# Patient Record
Sex: FEMALE | Race: WHITE | NOT HISPANIC OR LATINO | Employment: FULL TIME | RURAL
[De-identification: names, ages, dates, MRNs, and addresses within clinical notes are randomized per-mention and may not be internally consistent; named-entity substitution may affect disease eponyms.]

---

## 2020-12-15 ENCOUNTER — HISTORICAL (OUTPATIENT)
Dept: ADMINISTRATIVE | Facility: HOSPITAL | Age: 35
End: 2020-12-15

## 2020-12-15 LAB
BACTERIA #/AREA URNS HPF: ABNORMAL /HPF
BILIRUB UR QL STRIP: NEGATIVE MG/DL
CLARITY UR: CLEAR
COLOR UR: YELLOW
GLUCOSE UR STRIP-MCNC: >=1000 MG/DL
KETONES UR STRIP-SCNC: NEGATIVE MG/DL
LEUKOCYTE ESTERASE UR QL STRIP: NEGATIVE LEU/UL
NITRITE UR QL STRIP: NEGATIVE
PH UR STRIP: 6 PH UNITS (ref 5–8)
PROT UR QL STRIP: NEGATIVE MG/DL
RBC # UR STRIP: NEGATIVE ERY/UL
RBC #/AREA URNS HPF: ABNORMAL /HPF (ref 0–3)
SP GR UR STRIP: 1.02 (ref 1–1.03)
SQUAMOUS #/AREA URNS LPF: ABNORMAL /LPF
UROBILINOGEN UR STRIP-ACNC: 0.2 EU/DL
WBC #/AREA URNS HPF: ABNORMAL /HPF (ref 0–5)

## 2022-01-12 ENCOUNTER — OFFICE VISIT (OUTPATIENT)
Dept: PRIMARY CARE CLINIC | Facility: CLINIC | Age: 37
End: 2022-01-12
Payer: COMMERCIAL

## 2022-01-12 VITALS
SYSTOLIC BLOOD PRESSURE: 91 MMHG | TEMPERATURE: 98 F | HEART RATE: 74 BPM | RESPIRATION RATE: 18 BRPM | DIASTOLIC BLOOD PRESSURE: 65 MMHG | OXYGEN SATURATION: 100 %

## 2022-01-12 DIAGNOSIS — M79.10 MUSCLE PAIN: ICD-10-CM

## 2022-01-12 DIAGNOSIS — R06.02 SHORTNESS OF BREATH: ICD-10-CM

## 2022-01-12 DIAGNOSIS — D51.9 ANEMIA DUE TO VITAMIN B12 DEFICIENCY, UNSPECIFIED B12 DEFICIENCY TYPE: ICD-10-CM

## 2022-01-12 DIAGNOSIS — I10 WELL-CONTROLLED HYPERTENSION: ICD-10-CM

## 2022-01-12 DIAGNOSIS — R68.83 CHILLS: ICD-10-CM

## 2022-01-12 DIAGNOSIS — R51.9 NONINTRACTABLE HEADACHE, UNSPECIFIED CHRONICITY PATTERN, UNSPECIFIED HEADACHE TYPE: ICD-10-CM

## 2022-01-12 DIAGNOSIS — Z20.822 EXPOSURE TO COVID-19 VIRUS: Primary | ICD-10-CM

## 2022-01-12 LAB
CTP QC/QA: YES
FLUAV AG NPH QL: NEGATIVE
FLUBV AG NPH QL: NEGATIVE
SARS-COV-2 AG RESP QL IA.RAPID: NEGATIVE

## 2022-01-12 PROCEDURE — 99204 OFFICE O/P NEW MOD 45 MIN: CPT | Mod: ,,, | Performed by: NURSE PRACTITIONER

## 2022-01-12 PROCEDURE — 87635 SARS-COV-2 COVID-19 AMP PRB: CPT | Mod: ,,, | Performed by: CLINICAL MEDICAL LABORATORY

## 2022-01-12 PROCEDURE — 87635 SARS-COV-2 (COVID-19) QUALITATIVE PCR: ICD-10-PCS | Mod: ,,, | Performed by: CLINICAL MEDICAL LABORATORY

## 2022-01-12 PROCEDURE — 87428 POCT SARS-COV2 (COVID) WITH FLU ANTIGEN: ICD-10-PCS | Mod: QW,,, | Performed by: NURSE PRACTITIONER

## 2022-01-12 PROCEDURE — 87428 SARSCOV & INF VIR A&B AG IA: CPT | Mod: QW,,, | Performed by: NURSE PRACTITIONER

## 2022-01-12 PROCEDURE — 99204 PR OFFICE/OUTPT VISIT, NEW, LEVL IV, 45-59 MIN: ICD-10-PCS | Mod: ,,, | Performed by: NURSE PRACTITIONER

## 2022-01-12 RX ORDER — ERGOCALCIFEROL 1.25 MG/1
50000 CAPSULE ORAL
COMMUNITY
Start: 2021-11-16 | End: 2022-11-29

## 2022-01-12 RX ORDER — ATENOLOL AND CHLORTHALIDONE TABLET 50; 25 MG/1; MG/1
1 TABLET ORAL DAILY
COMMUNITY
Start: 2021-12-09 | End: 2023-11-09

## 2022-01-12 RX ORDER — PHENTERMINE HYDROCHLORIDE 37.5 MG/1
37.5 TABLET ORAL DAILY
COMMUNITY
Start: 2021-12-21 | End: 2022-11-29

## 2022-01-12 RX ORDER — SEMAGLUTIDE 1 MG/.5ML
1 INJECTION, SOLUTION SUBCUTANEOUS WEEKLY
COMMUNITY
Start: 2021-12-21 | End: 2022-11-29

## 2022-01-12 RX ORDER — PREDNISONE 20 MG/1
TABLET ORAL
Qty: 12 TABLET | Refills: 0 | Status: SHIPPED | OUTPATIENT
Start: 2022-01-12 | End: 2022-11-29

## 2022-01-12 RX ORDER — AZITHROMYCIN 250 MG/1
TABLET, FILM COATED ORAL
Qty: 6 TABLET | Refills: 0 | Status: SHIPPED | OUTPATIENT
Start: 2022-01-12 | End: 2022-01-17

## 2022-01-12 RX ORDER — CYANOCOBALAMIN 1000 UG/ML
1 INJECTION, SOLUTION INTRAMUSCULAR; SUBCUTANEOUS
COMMUNITY
Start: 2021-11-03 | End: 2022-11-29

## 2022-01-12 NOTE — PROGRESS NOTES
NEW CLINIC NOTE    Kathleen Gann is a 37 y.o. White female     Chief Complaint   Patient presents with    Headache    Sore Throat    Nausea    Fatigue    Cough    Generalized Body Aches    Nasal Congestion     Pt states symptom started 1/11/22. Has not taken anything otc. Has been exposed to covid.         SUBJECTIVE  Pt presents to clinic with an acute onset of chills, aches, headache, sore throat, and congestion. Reports cough is worsening as day goes on. All symptoms started last night after work. Reports she has been exposed to covid repeatedly at work. Denies taking meds for relief.      Current Outpatient Medications on File Prior to Visit   Medication Sig Dispense Refill    atenoloL-chlorthalidone (TENORETIC) 50-25 mg Tab Take 1 tablet by mouth once daily.      cyanocobalamin 1,000 mcg/mL injection Inject 1 mL into the muscle every 30 days.      phentermine (ADIPEX-P) 37.5 mg tablet Take 37.5 mg by mouth once daily.      VITAMIN D2 1,250 mcg (50,000 unit) capsule Take 50,000 Units by mouth every 7 days.      WEGOVY 1 mg/0.5 mL PnIj Inject 1 mg into the skin once a week.       No current facility-administered medications on file prior to visit.      Review of patient's allergies indicates:   Allergen Reactions    Penicillins         Past Medical History:   Diagnosis Date    Hypertension       Past Surgical History:   Procedure Laterality Date    HYSTERECTOMY      KNEE SURGERY Right      No family history on file.  Social History     Socioeconomic History    Marital status: Single   Tobacco Use    Smoking status: Current Every Day Smoker    Smokeless tobacco: Never Used   Substance and Sexual Activity    Alcohol use: Yes    Drug use: Never    Sexual activity: Yes        No results found for: WBC, HGB, HCT, MCV, PLT     CMP  No results found for: NA, K, CL, CO2, GLU, BUN, CREATININE, CALCIUM, PROT, ALBUMIN, BILITOT, ALKPHOS, AST, ALT, ANIONGAP, ESTGFRAFRICA, EGFRNONAA  No results found  for: LABA1C, HGBA1C      OBJECTIVE  BP 91/65 (BP Location: Left arm, Patient Position: Sitting, BP Method: Medium (Automatic))   Pulse 74   Temp 98.1 °F (36.7 °C) (Oral)   Resp 18   SpO2 100%      Physical Exam  Vitals and nursing note reviewed.   Constitutional:       Appearance: Normal appearance. She is obese.   HENT:      Right Ear: Tympanic membrane normal.      Left Ear: Tympanic membrane normal.      Nose: Congestion and rhinorrhea present.      Mouth/Throat:      Mouth: Mucous membranes are moist.      Pharynx: No oropharyngeal exudate or posterior oropharyngeal erythema.   Cardiovascular:      Rate and Rhythm: Normal rate and regular rhythm.      Pulses: Normal pulses.      Heart sounds: Normal heart sounds.   Pulmonary:      Effort: Pulmonary effort is normal.      Breath sounds: Normal breath sounds.   Musculoskeletal:         General: Normal range of motion.      Cervical back: Normal range of motion and neck supple.   Lymphadenopathy:      Cervical: No cervical adenopathy.   Skin:     General: Skin is warm.      Capillary Refill: Capillary refill takes less than 2 seconds.   Neurological:      Mental Status: She is alert and oriented to person, place, and time.   Psychiatric:         Mood and Affect: Mood normal.            ASSESSMENT & PLAN  1. Exposure to COVID-19 virus    2. Nonintractable headache, unspecified chronicity pattern, unspecified headache type    3. Shortness of breath    4. Chills    5. Muscle pain    6. Well-controlled hypertension    7. Anemia due to vitamin B12 deficiency, unspecified B12 deficiency type         Problem List Items Addressed This Visit    None     Visit Diagnoses     Exposure to COVID-19 virus    -  Primary    Relevant Medications    azithromycin (Z-ELLIOT) 250 MG tablet    predniSONE (DELTASONE) 20 MG tablet    Nonintractable headache, unspecified chronicity pattern, unspecified headache type        Relevant Orders    POCT SARS-COV2 (COVID) with Flu Antigen  (Completed)    Shortness of breath        Relevant Orders    COVID-19 Routine Screening    Chills        Relevant Orders    COVID-19 Routine Screening    Muscle pain        Relevant Orders    COVID-19 Routine Screening    Well-controlled hypertension        Anemia due to vitamin B12 deficiency, unspecified B12 deficiency type              Follow up in about 2 weeks (around 1/26/2022).   Rapid flu and covid neg  Symptoms consistent with covid and it has been <24 hours since onset of symptoms  Will treat as covid and order pcr covid test  Instructed quarantine at least until pcr results.

## 2022-01-12 NOTE — LETTER
January 12, 2022      [facility]  [address] [facility] [address]  Phone: [phone]  Fax: [phone]       Patient: Kathleen Gann   YOB: 1985  Date of Visit: 01/12/2022    To Whom It May Concern:    IZZY Gann  was at [facility] on 01/12/2022. The patient is not release. Pending further testing. If you have any questions or concerns, or if I can be of further assistance, please do not hesitate to contact me.    Sincerely,    Kathleen Newton NP

## 2022-01-13 ENCOUNTER — TELEPHONE (OUTPATIENT)
Dept: PRIMARY CARE CLINIC | Facility: CLINIC | Age: 37
End: 2022-01-13
Payer: COMMERCIAL

## 2022-01-13 DIAGNOSIS — R05.9 COUGH: ICD-10-CM

## 2022-01-13 DIAGNOSIS — Z20.822 EXPOSURE TO COVID-19 VIRUS: Primary | ICD-10-CM

## 2022-01-13 LAB — SARS-COV-2 RNA RESP QL NAA+PROBE: NEGATIVE

## 2022-01-13 RX ORDER — PROMETHAZINE HYDROCHLORIDE AND DEXTROMETHORPHAN HYDROBROMIDE 6.25; 15 MG/5ML; MG/5ML
5 SYRUP ORAL EVERY 4 HOURS PRN
Qty: 125 ML | Refills: 0 | Status: SHIPPED | OUTPATIENT
Start: 2022-01-13 | End: 2022-01-23

## 2022-01-13 NOTE — TELEPHONE ENCOUNTER
----- Message from Kathleen Newton NP sent at 1/13/2022  1:42 PM CST -----  Please notify pt that results are negative.      Pt notified, verbalized understanding.

## 2022-11-29 ENCOUNTER — OFFICE VISIT (OUTPATIENT)
Dept: PRIMARY CARE CLINIC | Facility: CLINIC | Age: 37
End: 2022-11-29
Payer: COMMERCIAL

## 2022-11-29 VITALS
TEMPERATURE: 98 F | OXYGEN SATURATION: 98 % | RESPIRATION RATE: 18 BRPM | WEIGHT: 224 LBS | SYSTOLIC BLOOD PRESSURE: 131 MMHG | HEIGHT: 64 IN | HEART RATE: 95 BPM | BODY MASS INDEX: 38.24 KG/M2 | DIASTOLIC BLOOD PRESSURE: 92 MMHG

## 2022-11-29 DIAGNOSIS — Z76.0 MEDICATION REFILL: ICD-10-CM

## 2022-11-29 DIAGNOSIS — N30.01 ACUTE CYSTITIS WITH HEMATURIA: Primary | ICD-10-CM

## 2022-11-29 LAB
BILIRUB SERPL-MCNC: ABNORMAL MG/DL
BLOOD URINE, POC: ABNORMAL
COLOR, POC UA: YELLOW
GLUCOSE UR QL STRIP: ABNORMAL
KETONES UR QL STRIP: ABNORMAL
LEUKOCYTE ESTERASE URINE, POC: ABNORMAL
NITRITE, POC UA: ABNORMAL
PH, POC UA: 5.5
PROTEIN, POC: ABNORMAL
SPECIFIC GRAVITY, POC UA: >1.03
UROBILINOGEN, POC UA: 0.2

## 2022-11-29 PROCEDURE — 99213 PR OFFICE/OUTPT VISIT, EST, LEVL III, 20-29 MIN: ICD-10-PCS | Mod: ,,, | Performed by: STUDENT IN AN ORGANIZED HEALTH CARE EDUCATION/TRAINING PROGRAM

## 2022-11-29 PROCEDURE — 3008F PR BODY MASS INDEX (BMI) DOCUMENTED: ICD-10-PCS | Mod: CPTII,,, | Performed by: STUDENT IN AN ORGANIZED HEALTH CARE EDUCATION/TRAINING PROGRAM

## 2022-11-29 PROCEDURE — 81003 POCT URINALYSIS W/O SCOPE: ICD-10-PCS | Mod: QW,,, | Performed by: STUDENT IN AN ORGANIZED HEALTH CARE EDUCATION/TRAINING PROGRAM

## 2022-11-29 PROCEDURE — 3075F SYST BP GE 130 - 139MM HG: CPT | Mod: CPTII,,, | Performed by: STUDENT IN AN ORGANIZED HEALTH CARE EDUCATION/TRAINING PROGRAM

## 2022-11-29 PROCEDURE — 1159F PR MEDICATION LIST DOCUMENTED IN MEDICAL RECORD: ICD-10-PCS | Mod: CPTII,,, | Performed by: STUDENT IN AN ORGANIZED HEALTH CARE EDUCATION/TRAINING PROGRAM

## 2022-11-29 PROCEDURE — 81003 URINALYSIS AUTO W/O SCOPE: CPT | Mod: QW,,, | Performed by: STUDENT IN AN ORGANIZED HEALTH CARE EDUCATION/TRAINING PROGRAM

## 2022-11-29 PROCEDURE — 1159F MED LIST DOCD IN RCRD: CPT | Mod: CPTII,,, | Performed by: STUDENT IN AN ORGANIZED HEALTH CARE EDUCATION/TRAINING PROGRAM

## 2022-11-29 PROCEDURE — 3008F BODY MASS INDEX DOCD: CPT | Mod: CPTII,,, | Performed by: STUDENT IN AN ORGANIZED HEALTH CARE EDUCATION/TRAINING PROGRAM

## 2022-11-29 PROCEDURE — 99213 OFFICE O/P EST LOW 20 MIN: CPT | Mod: ,,, | Performed by: STUDENT IN AN ORGANIZED HEALTH CARE EDUCATION/TRAINING PROGRAM

## 2022-11-29 PROCEDURE — 3075F PR MOST RECENT SYSTOLIC BLOOD PRESS GE 130-139MM HG: ICD-10-PCS | Mod: CPTII,,, | Performed by: STUDENT IN AN ORGANIZED HEALTH CARE EDUCATION/TRAINING PROGRAM

## 2022-11-29 PROCEDURE — 3080F PR MOST RECENT DIASTOLIC BLOOD PRESSURE >= 90 MM HG: ICD-10-PCS | Mod: CPTII,,, | Performed by: STUDENT IN AN ORGANIZED HEALTH CARE EDUCATION/TRAINING PROGRAM

## 2022-11-29 PROCEDURE — 3080F DIAST BP >= 90 MM HG: CPT | Mod: CPTII,,, | Performed by: STUDENT IN AN ORGANIZED HEALTH CARE EDUCATION/TRAINING PROGRAM

## 2022-11-29 RX ORDER — TIRZEPATIDE 5 MG/.5ML
INJECTION, SOLUTION SUBCUTANEOUS
COMMUNITY
End: 2023-12-08

## 2022-11-29 RX ORDER — ATORVASTATIN CALCIUM 10 MG/1
10 TABLET, FILM COATED ORAL
COMMUNITY
Start: 2022-10-14 | End: 2022-11-29 | Stop reason: SDUPTHER

## 2022-11-29 RX ORDER — ESCITALOPRAM OXALATE 10 MG/1
10 TABLET ORAL
COMMUNITY
Start: 2022-10-14 | End: 2023-12-08

## 2022-11-29 RX ORDER — LIDOCAINE AND PRILOCAINE 25; 25 MG/G; MG/G
CREAM TOPICAL
COMMUNITY
Start: 2022-11-22

## 2022-11-29 RX ORDER — SULFAMETHOXAZOLE AND TRIMETHOPRIM 800; 160 MG/1; MG/1
1 TABLET ORAL 2 TIMES DAILY
Qty: 6 TABLET | Refills: 0 | Status: SHIPPED | OUTPATIENT
Start: 2022-11-29 | End: 2022-12-02

## 2022-11-29 RX ORDER — HYDROCODONE BITARTRATE AND ACETAMINOPHEN 5; 325 MG/1; MG/1
1 TABLET ORAL EVERY 6 HOURS PRN
COMMUNITY
Start: 2022-11-08 | End: 2023-12-08

## 2022-11-29 RX ORDER — PROCHLORPERAZINE MALEATE 10 MG
10 TABLET ORAL 4 TIMES DAILY PRN
COMMUNITY
Start: 2022-11-22 | End: 2023-12-08

## 2022-11-29 RX ORDER — ONDANSETRON HYDROCHLORIDE 8 MG/1
8 TABLET, FILM COATED ORAL
COMMUNITY
Start: 2022-11-22 | End: 2023-12-08

## 2022-11-29 RX ORDER — METHOCARBAMOL 750 MG/1
750 TABLET, FILM COATED ORAL
COMMUNITY
Start: 2022-07-22 | End: 2023-12-08

## 2022-11-29 RX ORDER — ALUMINUM HYDROXIDE, MAGNESIUM HYDROXIDE, SIMETHICONE 400; 400; 40 MG/10ML; MG/10ML; MG/10ML
SUSPENSION ORAL
COMMUNITY
Start: 2022-11-28

## 2022-11-29 RX ORDER — ATORVASTATIN CALCIUM 10 MG/1
10 TABLET, FILM COATED ORAL NIGHTLY
Qty: 30 TABLET | Refills: 5 | Status: SHIPPED | OUTPATIENT
Start: 2022-11-29 | End: 2024-03-12

## 2022-11-29 NOTE — PROGRESS NOTES
Subjective:      Kathleen Gann is a 37 y.o.female who presents to clinic for Urinary Tract Infection    Symptoms began 3 days ago and include: dysuria, urinary frequency, and some abdominal pain. Denies fevers, chills, nausea, vomiting, hematuria, urinary urgency, or flank pain. She is currently receiving chemotherapy for breast cancer so she has not been taking any medications for it.       ROS as above    Past Medical History:  has a past medical history of Hypertension.   Past Surgical History:  has a past surgical history that includes Hysterectomy and Knee surgery (Right).  Family History: family history is not on file.  Social History:  reports that she has been smoking. She has never used smokeless tobacco. She reports current alcohol use. She reports that she does not use drugs.  Allergies:   Review of patient's allergies indicates:   Allergen Reactions    Penicillins        Objective:     Vitals:    11/29/22 0814   BP: (!) 131/92   Pulse: 95   Resp: 18   Temp: 98.2 °F (36.8 °C)     Physical Exam  Vitals reviewed.   Constitutional:       General: She is not in acute distress.     Appearance: Normal appearance. She is not ill-appearing.   HENT:      Head: Normocephalic and atraumatic.      Right Ear: External ear normal.      Left Ear: External ear normal.   Eyes:      General: No scleral icterus.        Right eye: No discharge.         Left eye: No discharge.      Conjunctiva/sclera: Conjunctivae normal.   Cardiovascular:      Rate and Rhythm: Normal rate and regular rhythm.      Pulses: Normal pulses.   Pulmonary:      Effort: Pulmonary effort is normal. No respiratory distress.      Breath sounds: Normal breath sounds. No wheezing.   Abdominal:      Tenderness: There is right CVA tenderness. There is no left CVA tenderness.   Neurological:      General: No focal deficit present.      Mental Status: She is alert.   Psychiatric:         Behavior: Behavior normal.        Assessment/Plan:     1. Acute  cystitis with hematuria  - UA with signs of dehydration and trace blood; signs/symptoms for UTI high  - with pt currently on chemotherapy, will obtain ucx and empirically treat  - POCT URINALYSIS W/O SCOPE  - sulfamethoxazole-trimethoprim 800-160mg (BACTRIM DS) 800-160 mg Tab; Take 1 tablet by mouth 2 (two) times daily. for 3 days  Dispense: 6 tablet; Refill: 0  - Urine culture; Future    2. Medication refill  - atorvastatin (LIPITOR) 10 MG tablet; Take 1 tablet (10 mg total) by mouth every evening.  Dispense: 30 tablet; Refill: 5      Return to clinic if symptoms persist and as needed.     Makenzie Millan MD  Family Medicine  11/29/2022

## 2023-08-10 LAB
CHOLEST SERPL-MSCNC: 186 MG/DL (ref 0–200)
CHOLEST SERPL-MSCNC: 186 MG/DL (ref 0–200)
HDLC SERPL-MCNC: 42 MG/DL
HDLC SERPL-MCNC: 42 MG/DL
HIV1 + HIV2 AB + P24 AG: NONREACTIVE
LDL/HDL RATIO: 1.8
LDL/HDL RATIO: 1.8
LDLC SERPL CALC-MCNC: 76 MG/DL (ref 0–160)
LDLC SERPL CALC-MCNC: 76 MG/DL (ref 0–160)
TRIGL SERPL-MCNC: 342 MG/DL
TRIGL SERPL-MCNC: 342 MG/DL

## 2023-08-12 LAB
HUMAN PAPILLOMAVIRUS (HPV): NORMAL
PAP RECOMMENDATION EXT: NORMAL

## 2023-08-21 ENCOUNTER — OFFICE VISIT (OUTPATIENT)
Dept: PRIMARY CARE CLINIC | Facility: CLINIC | Age: 38
End: 2023-08-21
Payer: COMMERCIAL

## 2023-08-21 ENCOUNTER — PATIENT OUTREACH (OUTPATIENT)
Dept: ADMINISTRATIVE | Facility: HOSPITAL | Age: 38
End: 2023-08-21

## 2023-08-21 VITALS
BODY MASS INDEX: 45.58 KG/M2 | SYSTOLIC BLOOD PRESSURE: 123 MMHG | DIASTOLIC BLOOD PRESSURE: 79 MMHG | OXYGEN SATURATION: 95 % | TEMPERATURE: 98 F | HEIGHT: 64 IN | RESPIRATION RATE: 18 BRPM | WEIGHT: 267 LBS | HEART RATE: 68 BPM

## 2023-08-21 DIAGNOSIS — Z85.3 HX OF BREAST CANCER: Primary | ICD-10-CM

## 2023-08-21 PROCEDURE — 3078F PR MOST RECENT DIASTOLIC BLOOD PRESSURE < 80 MM HG: ICD-10-PCS | Mod: CPTII,,, | Performed by: STUDENT IN AN ORGANIZED HEALTH CARE EDUCATION/TRAINING PROGRAM

## 2023-08-21 PROCEDURE — 3008F BODY MASS INDEX DOCD: CPT | Mod: CPTII,,, | Performed by: STUDENT IN AN ORGANIZED HEALTH CARE EDUCATION/TRAINING PROGRAM

## 2023-08-21 PROCEDURE — 1159F MED LIST DOCD IN RCRD: CPT | Mod: CPTII,,, | Performed by: STUDENT IN AN ORGANIZED HEALTH CARE EDUCATION/TRAINING PROGRAM

## 2023-08-21 PROCEDURE — 99212 PR OFFICE/OUTPT VISIT, EST, LEVL II, 10-19 MIN: ICD-10-PCS | Mod: ,,, | Performed by: STUDENT IN AN ORGANIZED HEALTH CARE EDUCATION/TRAINING PROGRAM

## 2023-08-21 PROCEDURE — 3074F PR MOST RECENT SYSTOLIC BLOOD PRESSURE < 130 MM HG: ICD-10-PCS | Mod: CPTII,,, | Performed by: STUDENT IN AN ORGANIZED HEALTH CARE EDUCATION/TRAINING PROGRAM

## 2023-08-21 PROCEDURE — 99212 OFFICE O/P EST SF 10 MIN: CPT | Mod: ,,, | Performed by: STUDENT IN AN ORGANIZED HEALTH CARE EDUCATION/TRAINING PROGRAM

## 2023-08-21 PROCEDURE — 3078F DIAST BP <80 MM HG: CPT | Mod: CPTII,,, | Performed by: STUDENT IN AN ORGANIZED HEALTH CARE EDUCATION/TRAINING PROGRAM

## 2023-08-21 PROCEDURE — 1160F PR REVIEW ALL MEDS BY PRESCRIBER/CLIN PHARMACIST DOCUMENTED: ICD-10-PCS | Mod: CPTII,,, | Performed by: STUDENT IN AN ORGANIZED HEALTH CARE EDUCATION/TRAINING PROGRAM

## 2023-08-21 PROCEDURE — 3074F SYST BP LT 130 MM HG: CPT | Mod: CPTII,,, | Performed by: STUDENT IN AN ORGANIZED HEALTH CARE EDUCATION/TRAINING PROGRAM

## 2023-08-21 PROCEDURE — 1160F RVW MEDS BY RX/DR IN RCRD: CPT | Mod: CPTII,,, | Performed by: STUDENT IN AN ORGANIZED HEALTH CARE EDUCATION/TRAINING PROGRAM

## 2023-08-21 PROCEDURE — 1159F PR MEDICATION LIST DOCUMENTED IN MEDICAL RECORD: ICD-10-PCS | Mod: CPTII,,, | Performed by: STUDENT IN AN ORGANIZED HEALTH CARE EDUCATION/TRAINING PROGRAM

## 2023-08-21 PROCEDURE — 3008F PR BODY MASS INDEX (BMI) DOCUMENTED: ICD-10-PCS | Mod: CPTII,,, | Performed by: STUDENT IN AN ORGANIZED HEALTH CARE EDUCATION/TRAINING PROGRAM

## 2023-08-21 RX ORDER — ATENOLOL 25 MG/1
25 TABLET ORAL
COMMUNITY
Start: 2023-08-04 | End: 2023-11-09 | Stop reason: SDUPTHER

## 2023-08-21 NOTE — PROGRESS NOTES
Subjective:      Kathleen Gann is a 38 y.o.female who presents to clinic for Follow-up (For lab results she said they were sent from [place])    Patient presents to clinic to discuss lab results that were obtained by her ObGyn. She has a history of breast cancer for which she is undergoing chemotherapy and is about to start radiation therapy. She sees Dr. Vee at [facility]. Recently saw Radiation Oncologist earlier this month to start radiation therapy.       ROS     Past Medical History:  has a past medical history of Cancer and Hypertension.   Past Surgical History:  has a past surgical history that includes Hysterectomy; Knee surgery (Right); and Lymphadenectomy (Left).  Family History: family history includes Depression in her father; Hypertension in her father.  Social History:  reports that she has been smoking vaping with nicotine. She has never used smokeless tobacco. She reports that she does not currently use alcohol. She reports that she does not use drugs.  Allergies:   Review of patient's allergies indicates:   Allergen Reactions    Penicillins Hives       Objective:     Vitals:    08/21/23 0831   BP: 123/79   Pulse: 68   Resp: 18   Temp: 98.1 °F (36.7 °C)     Physical Exam  Vitals reviewed.   Constitutional:       General: She is not in acute distress.     Appearance: Normal appearance. She is not ill-appearing, toxic-appearing or diaphoretic.   HENT:      Head: Normocephalic and atraumatic.   Eyes:      General: No scleral icterus.        Right eye: No discharge.         Left eye: No discharge.   Pulmonary:      Effort: Pulmonary effort is normal. No respiratory distress.   Neurological:      General: No focal deficit present.      Mental Status: She is alert.   Psychiatric:         Behavior: Behavior normal.          Assessment/Plan:     1. Hx of breast cancer  - have yet to receive lab results from Dr. Vee' office  - will review and reach out to patient once  received      Health Maintenance Due   Topic Date Due    Hepatitis C Screening  Never done    Lipid Panel  Never done    COVID-19 Vaccine (1) Never done    Pneumococcal Vaccines (Age 0-64) (1 - PCV) Never done    HIV Screening  Never done    TETANUS VACCINE  Never done    Mammogram  Never done    Hemoglobin A1c (Diabetic Prevention Screening)  Never done         Return to clinic pending records or sooner if needed.     Makenzie Millan MD  Family Medicine  08/21/2023

## 2023-08-21 NOTE — PROGRESS NOTES
Health Maintenance Due   Topic Date Due    Hepatitis C Screening  Never done    COVID-19 Vaccine (1) Never done    Pneumococcal Vaccines (Age 0-64) (1 - PCV) Never done    TETANUS VACCINE  Never done    Mammogram  Never done    Hemoglobin A1c (Diabetic Prevention Screening)  Never done     Uploaded pt lipid panel and HIV from [facility]. Pt need to be scheduled for mammogram and Hep C to be compliant

## 2023-08-22 ENCOUNTER — TELEPHONE (OUTPATIENT)
Dept: PRIMARY CARE CLINIC | Facility: CLINIC | Age: 38
End: 2023-08-22
Payer: COMMERCIAL

## 2023-08-22 NOTE — TELEPHONE ENCOUNTER
Called pt regarding lipid panel results. Elevated triglycerides but non fasting check. Pt will RTC for fasting lipid check.

## 2023-08-28 ENCOUNTER — PATIENT OUTREACH (OUTPATIENT)
Dept: ADMINISTRATIVE | Facility: HOSPITAL | Age: 38
End: 2023-08-28

## 2023-08-28 NOTE — LETTER
AUTHORIZATION FOR RELEASE OF   CONFIDENTIAL INFORMATION    Dear [facility],    We are seeing Kathleen Gann, date of birth 1985, in the clinic at [facility]. Makenzie Millan MD is the patient's PCP. Kathleen Gann has an outstanding lab/procedure at the time we reviewed her chart. In order to help keep her health information updated, she has authorized us to request the following medical record(s):        (  )  MAMMOGRAM                                      (  )  COLONOSCOPY      (  )  PAP SMEAR                                          (  )  OUTSIDE LAB RESULTS     (  )  DEXA SCAN                                          (  )  EYE EXAM            (  )  FOOT EXAM                                          (X)  ENTIRE RECORD     (  )  OUTSIDE IMMUNIZATIONS                 (  )  _______________         Please fax records to [facility] Coordinator, Anne Garcia, [phone].     If you have any questions, please contact [phone].        Patient Name: Kathleen Gann  : 1985  Patient Phone #: [phone]

## 2023-08-31 ENCOUNTER — PATIENT OUTREACH (OUTPATIENT)
Dept: ADMINISTRATIVE | Facility: HOSPITAL | Age: 38
End: 2023-08-31

## 2023-11-09 ENCOUNTER — OFFICE VISIT (OUTPATIENT)
Dept: PRIMARY CARE CLINIC | Facility: CLINIC | Age: 38
End: 2023-11-09
Payer: COMMERCIAL

## 2023-11-09 VITALS
TEMPERATURE: 99 F | HEIGHT: 64 IN | DIASTOLIC BLOOD PRESSURE: 79 MMHG | HEART RATE: 70 BPM | OXYGEN SATURATION: 100 % | BODY MASS INDEX: 44.73 KG/M2 | WEIGHT: 262 LBS | SYSTOLIC BLOOD PRESSURE: 116 MMHG | RESPIRATION RATE: 18 BRPM

## 2023-11-09 DIAGNOSIS — J06.9 UPPER RESPIRATORY TRACT INFECTION, UNSPECIFIED TYPE: Primary | ICD-10-CM

## 2023-11-09 DIAGNOSIS — I10 PRIMARY HYPERTENSION: ICD-10-CM

## 2023-11-09 DIAGNOSIS — D84.9 IMMUNOCOMPROMISED PATIENT: ICD-10-CM

## 2023-11-09 LAB
CTP QC/QA: YES
FLUAV AG NPH QL: NEGATIVE
FLUBV AG NPH QL: NEGATIVE
RSV RAPID ANTIGEN: NEGATIVE
SARS-COV-2 RDRP RESP QL NAA+PROBE: NEGATIVE

## 2023-11-09 PROCEDURE — 1159F MED LIST DOCD IN RCRD: CPT | Mod: CPTII,,, | Performed by: STUDENT IN AN ORGANIZED HEALTH CARE EDUCATION/TRAINING PROGRAM

## 2023-11-09 PROCEDURE — 87635 SARS-COV-2 COVID-19 AMP PRB: CPT | Mod: ,,, | Performed by: STUDENT IN AN ORGANIZED HEALTH CARE EDUCATION/TRAINING PROGRAM

## 2023-11-09 PROCEDURE — 96372 PR INJECTION,THERAP/PROPH/DIAG2ST, IM OR SUBCUT: ICD-10-PCS | Mod: ,,, | Performed by: STUDENT IN AN ORGANIZED HEALTH CARE EDUCATION/TRAINING PROGRAM

## 2023-11-09 PROCEDURE — 99213 OFFICE O/P EST LOW 20 MIN: CPT | Mod: 25,,, | Performed by: STUDENT IN AN ORGANIZED HEALTH CARE EDUCATION/TRAINING PROGRAM

## 2023-11-09 PROCEDURE — 87634 POCT RAPID RSV: ICD-10-PCS | Mod: QW,,, | Performed by: STUDENT IN AN ORGANIZED HEALTH CARE EDUCATION/TRAINING PROGRAM

## 2023-11-09 PROCEDURE — 3074F SYST BP LT 130 MM HG: CPT | Mod: CPTII,,, | Performed by: STUDENT IN AN ORGANIZED HEALTH CARE EDUCATION/TRAINING PROGRAM

## 2023-11-09 PROCEDURE — 3008F BODY MASS INDEX DOCD: CPT | Mod: CPTII,,, | Performed by: STUDENT IN AN ORGANIZED HEALTH CARE EDUCATION/TRAINING PROGRAM

## 2023-11-09 PROCEDURE — 99213 PR OFFICE/OUTPT VISIT, EST, LEVL III, 20-29 MIN: ICD-10-PCS | Mod: 25,,, | Performed by: STUDENT IN AN ORGANIZED HEALTH CARE EDUCATION/TRAINING PROGRAM

## 2023-11-09 PROCEDURE — 87635: ICD-10-PCS | Mod: ,,, | Performed by: STUDENT IN AN ORGANIZED HEALTH CARE EDUCATION/TRAINING PROGRAM

## 2023-11-09 PROCEDURE — 3008F PR BODY MASS INDEX (BMI) DOCUMENTED: ICD-10-PCS | Mod: CPTII,,, | Performed by: STUDENT IN AN ORGANIZED HEALTH CARE EDUCATION/TRAINING PROGRAM

## 2023-11-09 PROCEDURE — 1159F PR MEDICATION LIST DOCUMENTED IN MEDICAL RECORD: ICD-10-PCS | Mod: CPTII,,, | Performed by: STUDENT IN AN ORGANIZED HEALTH CARE EDUCATION/TRAINING PROGRAM

## 2023-11-09 PROCEDURE — 3078F PR MOST RECENT DIASTOLIC BLOOD PRESSURE < 80 MM HG: ICD-10-PCS | Mod: CPTII,,, | Performed by: STUDENT IN AN ORGANIZED HEALTH CARE EDUCATION/TRAINING PROGRAM

## 2023-11-09 PROCEDURE — 87634 RSV DNA/RNA AMP PROBE: CPT | Mod: QW,,, | Performed by: STUDENT IN AN ORGANIZED HEALTH CARE EDUCATION/TRAINING PROGRAM

## 2023-11-09 PROCEDURE — 3074F PR MOST RECENT SYSTOLIC BLOOD PRESSURE < 130 MM HG: ICD-10-PCS | Mod: CPTII,,, | Performed by: STUDENT IN AN ORGANIZED HEALTH CARE EDUCATION/TRAINING PROGRAM

## 2023-11-09 PROCEDURE — 87804 POCT INFLUENZA A/B: ICD-10-PCS | Mod: 59,QW,, | Performed by: STUDENT IN AN ORGANIZED HEALTH CARE EDUCATION/TRAINING PROGRAM

## 2023-11-09 PROCEDURE — 1160F PR REVIEW ALL MEDS BY PRESCRIBER/CLIN PHARMACIST DOCUMENTED: ICD-10-PCS | Mod: CPTII,,, | Performed by: STUDENT IN AN ORGANIZED HEALTH CARE EDUCATION/TRAINING PROGRAM

## 2023-11-09 PROCEDURE — 87804 INFLUENZA ASSAY W/OPTIC: CPT | Mod: 59,QW,, | Performed by: STUDENT IN AN ORGANIZED HEALTH CARE EDUCATION/TRAINING PROGRAM

## 2023-11-09 PROCEDURE — 1160F RVW MEDS BY RX/DR IN RCRD: CPT | Mod: CPTII,,, | Performed by: STUDENT IN AN ORGANIZED HEALTH CARE EDUCATION/TRAINING PROGRAM

## 2023-11-09 PROCEDURE — 3078F DIAST BP <80 MM HG: CPT | Mod: CPTII,,, | Performed by: STUDENT IN AN ORGANIZED HEALTH CARE EDUCATION/TRAINING PROGRAM

## 2023-11-09 PROCEDURE — 96372 THER/PROPH/DIAG INJ SC/IM: CPT | Mod: ,,, | Performed by: STUDENT IN AN ORGANIZED HEALTH CARE EDUCATION/TRAINING PROGRAM

## 2023-11-09 RX ORDER — PAROXETINE 10 MG/1
10 TABLET, FILM COATED ORAL NIGHTLY
COMMUNITY
Start: 2023-11-04 | End: 2023-12-08

## 2023-11-09 RX ORDER — PROMETHAZINE HYDROCHLORIDE AND DEXTROMETHORPHAN HYDROBROMIDE 6.25; 15 MG/5ML; MG/5ML
5 SYRUP ORAL EVERY 6 HOURS PRN
Qty: 180 ML | Refills: 0 | Status: SHIPPED | OUTPATIENT
Start: 2023-11-09 | End: 2023-11-19

## 2023-11-09 RX ORDER — TRIAMTERENE AND HYDROCHLOROTHIAZIDE 37.5; 25 MG/1; MG/1
1 CAPSULE ORAL EVERY MORNING
Qty: 90 CAPSULE | Refills: 0 | Status: SHIPPED | OUTPATIENT
Start: 2023-11-09 | End: 2024-03-12 | Stop reason: SDUPTHER

## 2023-11-09 RX ORDER — ATENOLOL 25 MG/1
25 TABLET ORAL DAILY
Qty: 90 TABLET | Refills: 0 | Status: SHIPPED | OUTPATIENT
Start: 2023-11-09 | End: 2024-03-07 | Stop reason: SDUPTHER

## 2023-11-09 RX ORDER — TAMOXIFEN CITRATE 20 MG/1
20 TABLET ORAL
COMMUNITY
Start: 2023-10-18

## 2023-11-09 RX ORDER — MECLIZINE HCL 12.5 MG 12.5 MG/1
12.5-25 TABLET ORAL
COMMUNITY
Start: 2023-11-04 | End: 2023-12-08

## 2023-11-09 RX ORDER — POTASSIUM CHLORIDE 750 MG/1
10 TABLET, EXTENDED RELEASE ORAL
COMMUNITY
Start: 2023-11-04

## 2023-11-09 RX ORDER — CEFTRIAXONE 1 G/1
1 INJECTION, POWDER, FOR SOLUTION INTRAMUSCULAR; INTRAVENOUS
Status: COMPLETED | OUTPATIENT
Start: 2023-11-09 | End: 2023-11-09

## 2023-11-09 RX ORDER — TRIAMTERENE AND HYDROCHLOROTHIAZIDE 37.5; 25 MG/1; MG/1
1 CAPSULE ORAL EVERY MORNING
COMMUNITY
Start: 2023-11-04 | End: 2023-11-09 | Stop reason: SDUPTHER

## 2023-11-09 RX ORDER — DIPHENOXYLATE HYDROCHLORIDE AND ATROPINE SULFATE 2.5; .025 MG/1; MG/1
TABLET ORAL
COMMUNITY
Start: 2023-11-04 | End: 2023-12-08

## 2023-11-09 RX ORDER — CEFDINIR 300 MG/1
300 CAPSULE ORAL 2 TIMES DAILY
Qty: 18 CAPSULE | Refills: 0 | Status: SHIPPED | OUTPATIENT
Start: 2023-11-10 | End: 2023-11-19

## 2023-11-09 RX ADMIN — CEFTRIAXONE 1 G: 1 INJECTION, POWDER, FOR SOLUTION INTRAMUSCULAR; INTRAVENOUS at 04:11

## 2023-11-09 NOTE — PROGRESS NOTES
Subjective:      Kathleen Gann is a 38 y.o.female who presents to clinic for Cough, Headache, Sore Throat, Hoarse, and Chest Congestion      Symptoms began Sunday and include: cough, sore throat, headaches, chest congestion, SOB, and loss of voice. She was around her grandkids who are RSV+. She hasn't been taking anything for her symptoms. She is currently receiving chemotherapy and radiation therapy for breast cancer.     Patient also requesting refill of her blood pressure medications. Denies any issues with current dose.    Past Medical History:  has a past medical history of Cancer and Hypertension.   Past Surgical History:  has a past surgical history that includes Hysterectomy; Knee surgery (Right); and Lymphadenectomy (Left).  Family History: family history includes Depression in her father; Hypertension in her father.  Social History:  reports that she has been smoking vaping with nicotine. She has never used smokeless tobacco. She reports that she does not currently use alcohol. She reports that she does not use drugs.  Allergies:   Review of patient's allergies indicates:   Allergen Reactions    Penicillins Hives, Other (See Comments), Rash, Shortness Of Breath and Swelling       Objective:     Vitals:    11/09/23 1541   BP: 116/79   Pulse: 70   Resp: 18   Temp: 98.6 °F (37 °C)     Physical Exam  Vitals reviewed.   Constitutional:       General: She is not in acute distress.     Appearance: Normal appearance. She is not ill-appearing.   HENT:      Head: Normocephalic and atraumatic.      Right Ear: Tympanic membrane, ear canal and external ear normal.      Left Ear: Tympanic membrane, ear canal and external ear normal.      Nose: Congestion present.      Mouth/Throat:      Mouth: Mucous membranes are moist.      Pharynx: Posterior oropharyngeal erythema present. No oropharyngeal exudate.   Eyes:      General: No scleral icterus.        Right eye: No discharge.         Left eye: No discharge.       Conjunctiva/sclera: Conjunctivae normal.   Cardiovascular:      Rate and Rhythm: Normal rate and regular rhythm.      Pulses: Normal pulses.   Pulmonary:      Effort: Pulmonary effort is normal. No respiratory distress.      Breath sounds: Normal breath sounds. No wheezing.   Musculoskeletal:      Right lower leg: No edema.      Left lower leg: No edema.   Neurological:      General: No focal deficit present.      Mental Status: She is alert.   Psychiatric:         Behavior: Behavior normal.              Assessment/Plan:     1. Upper respiratory tract infection, unspecified type  - testing neg; due to immunocompromised state, will treat aggressively with below  - ER precautions discussed  - encouraged continued supportive care; gargle with warm salt water, complete vocal rest  - POCT COVID-19 Rapid Screening  - POCT Influenza A/B  - POCT respiratory syncytial virus  - cefTRIAXone injection 1 g  - cefdinir (OMNICEF) 300 MG capsule; Take 1 capsule (300 mg total) by mouth 2 (two) times daily. for 9 days  Dispense: 18 capsule; Refill: 0  - promethazine-dextromethorphan (PROMETHAZINE-DM) 6.25-15 mg/5 mL Syrp; Take 5 mLs by mouth every 6 (six) hours as needed (cough and congestion).  Dispense: 180 mL; Refill: 0    2. Immunocompromised patient    3. Primary hypertension  - stable; medications refilled. Will need updated labs at next visit.   - atenoloL (TENORMIN) 25 MG tablet; Take 1 tablet (25 mg total) by mouth once daily.  Dispense: 90 tablet; Refill: 0  - triamterene-hydrochlorothiazide 37.5-25 mg (DYAZIDE) 37.5-25 mg per capsule; Take 1 capsule by mouth every morning.  Dispense: 90 capsule; Refill: 0          Return to clinic if symptoms persist/worsen and as needed.     Makenzie Junior MD  Family Medicine  11/09/2023

## 2023-12-08 ENCOUNTER — OFFICE VISIT (OUTPATIENT)
Dept: PRIMARY CARE CLINIC | Facility: CLINIC | Age: 38
End: 2023-12-08
Payer: COMMERCIAL

## 2023-12-08 VITALS
DIASTOLIC BLOOD PRESSURE: 79 MMHG | WEIGHT: 259 LBS | TEMPERATURE: 98 F | HEIGHT: 64 IN | BODY MASS INDEX: 44.22 KG/M2 | OXYGEN SATURATION: 100 % | HEART RATE: 63 BPM | SYSTOLIC BLOOD PRESSURE: 119 MMHG | RESPIRATION RATE: 18 BRPM

## 2023-12-08 DIAGNOSIS — E78.5 HYPERLIPIDEMIA, UNSPECIFIED HYPERLIPIDEMIA TYPE: ICD-10-CM

## 2023-12-08 DIAGNOSIS — J06.9 ACUTE UPPER RESPIRATORY INFECTION, UNSPECIFIED: Primary | ICD-10-CM

## 2023-12-08 DIAGNOSIS — R05.9 COUGH, UNSPECIFIED TYPE: ICD-10-CM

## 2023-12-08 LAB
CHOLEST SERPL-MCNC: 192 MG/DL (ref 0–200)
CHOLEST/HDLC SERPL: 5.6 {RATIO}
CTP QC/QA: YES
FLUAV AG NPH QL: NEGATIVE
FLUBV AG NPH QL: NEGATIVE
HDLC SERPL-MCNC: 34 MG/DL (ref 40–60)
LDLC SERPL CALC-MCNC: 120 MG/DL
LDLC/HDLC SERPL: 3.5 {RATIO}
NONHDLC SERPL-MCNC: 158 MG/DL
TRIGL SERPL-MCNC: 191 MG/DL (ref 35–150)
VLDLC SERPL-MCNC: 38 MG/DL

## 2023-12-08 PROCEDURE — 3074F PR MOST RECENT SYSTOLIC BLOOD PRESSURE < 130 MM HG: ICD-10-PCS | Mod: CPTII,,, | Performed by: NURSE PRACTITIONER

## 2023-12-08 PROCEDURE — 99214 OFFICE O/P EST MOD 30 MIN: CPT | Mod: 25,,, | Performed by: NURSE PRACTITIONER

## 2023-12-08 PROCEDURE — 87804 POCT INFLUENZA A/B: ICD-10-PCS | Mod: 59,QW,, | Performed by: NURSE PRACTITIONER

## 2023-12-08 PROCEDURE — 3074F SYST BP LT 130 MM HG: CPT | Mod: CPTII,,, | Performed by: NURSE PRACTITIONER

## 2023-12-08 PROCEDURE — 3078F DIAST BP <80 MM HG: CPT | Mod: CPTII,,, | Performed by: NURSE PRACTITIONER

## 2023-12-08 PROCEDURE — 80061 LIPID PANEL: ICD-10-PCS | Mod: ,,, | Performed by: CLINICAL MEDICAL LABORATORY

## 2023-12-08 PROCEDURE — 1159F PR MEDICATION LIST DOCUMENTED IN MEDICAL RECORD: ICD-10-PCS | Mod: CPTII,,, | Performed by: NURSE PRACTITIONER

## 2023-12-08 PROCEDURE — 96372 THER/PROPH/DIAG INJ SC/IM: CPT | Mod: ,,, | Performed by: NURSE PRACTITIONER

## 2023-12-08 PROCEDURE — 87804 INFLUENZA ASSAY W/OPTIC: CPT | Mod: 59,QW,, | Performed by: NURSE PRACTITIONER

## 2023-12-08 PROCEDURE — 3008F BODY MASS INDEX DOCD: CPT | Mod: CPTII,,, | Performed by: NURSE PRACTITIONER

## 2023-12-08 PROCEDURE — 80061 LIPID PANEL: CPT | Mod: ,,, | Performed by: CLINICAL MEDICAL LABORATORY

## 2023-12-08 PROCEDURE — 1159F MED LIST DOCD IN RCRD: CPT | Mod: CPTII,,, | Performed by: NURSE PRACTITIONER

## 2023-12-08 PROCEDURE — 3008F PR BODY MASS INDEX (BMI) DOCUMENTED: ICD-10-PCS | Mod: CPTII,,, | Performed by: NURSE PRACTITIONER

## 2023-12-08 PROCEDURE — 99214 PR OFFICE/OUTPT VISIT, EST, LEVL IV, 30-39 MIN: ICD-10-PCS | Mod: 25,,, | Performed by: NURSE PRACTITIONER

## 2023-12-08 PROCEDURE — 96372 PR INJECTION,THERAP/PROPH/DIAG2ST, IM OR SUBCUT: ICD-10-PCS | Mod: ,,, | Performed by: NURSE PRACTITIONER

## 2023-12-08 PROCEDURE — 3078F PR MOST RECENT DIASTOLIC BLOOD PRESSURE < 80 MM HG: ICD-10-PCS | Mod: CPTII,,, | Performed by: NURSE PRACTITIONER

## 2023-12-08 RX ORDER — CEFTRIAXONE 1 G/1
1 INJECTION, POWDER, FOR SOLUTION INTRAMUSCULAR; INTRAVENOUS
Status: COMPLETED | OUTPATIENT
Start: 2023-12-08 | End: 2023-12-08

## 2023-12-08 RX ORDER — AZITHROMYCIN 250 MG/1
TABLET, FILM COATED ORAL
Qty: 6 TABLET | Refills: 0 | Status: SHIPPED | OUTPATIENT
Start: 2023-12-08 | End: 2023-12-13

## 2023-12-08 RX ORDER — SILVER SULFADIAZINE 10 G/1000G
CREAM TOPICAL
COMMUNITY

## 2023-12-08 RX ORDER — DEXTROMETHORPHAN HYDROBROMIDE, GUAIFENESIN, PHENYLEPHRINE HYDROCHLORIDE 17.5; 400; 1 MG/1; MG/1; MG/1
1 TABLET ORAL EVERY 6 HOURS
COMMUNITY
Start: 2023-12-06

## 2023-12-08 RX ORDER — FLUTICASONE PROPIONATE 50 MCG
2 SPRAY, SUSPENSION (ML) NASAL
COMMUNITY
Start: 2023-12-06

## 2023-12-08 RX ORDER — DEXAMETHASONE SODIUM PHOSPHATE 4 MG/ML
4 INJECTION, SOLUTION INTRA-ARTICULAR; INTRALESIONAL; INTRAMUSCULAR; INTRAVENOUS; SOFT TISSUE
Status: COMPLETED | OUTPATIENT
Start: 2023-12-08 | End: 2023-12-08

## 2023-12-08 RX ORDER — PROMETHAZINE HYDROCHLORIDE AND DEXTROMETHORPHAN HYDROBROMIDE 6.25; 15 MG/5ML; MG/5ML
5 SYRUP ORAL EVERY 4 HOURS PRN
Qty: 125 ML | Refills: 0 | Status: SHIPPED | OUTPATIENT
Start: 2023-12-08 | End: 2023-12-18

## 2023-12-08 RX ORDER — VENLAFAXINE HYDROCHLORIDE 37.5 MG/1
CAPSULE, EXTENDED RELEASE ORAL
COMMUNITY
Start: 2023-11-29 | End: 2023-12-08

## 2023-12-08 RX ADMIN — DEXAMETHASONE SODIUM PHOSPHATE 4 MG: 4 INJECTION, SOLUTION INTRA-ARTICULAR; INTRALESIONAL; INTRAMUSCULAR; INTRAVENOUS; SOFT TISSUE at 09:12

## 2023-12-08 RX ADMIN — CEFTRIAXONE 1 G: 1 INJECTION, POWDER, FOR SOLUTION INTRAMUSCULAR; INTRAVENOUS at 09:12

## 2023-12-08 NOTE — PROGRESS NOTES
NEW CLINIC NOTE    Kathleen Gann is a 38 y.o. White female     Chief Complaint   Patient presents with    Cough    Chest Congestion        SUBJECTIVE  Pt presents to clinic today with a three day history of fever, chills, headache, cough, and runny nose, and sore throat. Reports she was seen by [facility] two days ago and tested positive for covid, flu, strep but negative. Was treated with nsaid and decongestents. Denies any improvement. Also taking otc sinus/cold/flu meds.     *of note, works at [employer] and has been exposed to flu, knowingly.        Current Outpatient Medications on File Prior to Visit   Medication Sig Dispense Refill    DECONEX DMX 10-17.5-400 mg Tab Take 1 tablet by mouth every 6 (six) hours.      fluticasone propionate (FLONASE) 50 mcg/actuation nasal spray 2 sprays by Each Nostril route.      ANTACID-ANTIGAS 200-200-20 mg/5 mL Susp SMARTSI By Mouth Every 2 Hours PRN      atenoloL (TENORMIN) 25 MG tablet Take 1 tablet (25 mg total) by mouth once daily. 90 tablet 0    atorvastatin (LIPITOR) 10 MG tablet Take 1 tablet (10 mg total) by mouth every evening. (Patient not taking: Reported on 2023) 30 tablet 5    LIDOcaine-prilocaine (EMLA) cream Apply topically.      potassium chloride (KLOR-CON) 10 MEQ TbSR Take 10 mEq by mouth.      silver sulfADIAZINE 1% (SSD) 1 % cream APPLY TO THE AFFECTED AREA(S) DAILY      tamoxifen (NOLVADEX) 20 MG Tab Take 20 mg by mouth.      triamterene-hydrochlorothiazide 37.5-25 mg (DYAZIDE) 37.5-25 mg per capsule Take 1 capsule by mouth every morning. 90 capsule 0    [DISCONTINUED] diphenoxylate-atropine 2.5-0.025 mg (LOMOTIL) 2.5-0.025 mg per tablet Take by mouth.      [DISCONTINUED] EScitalopram oxalate (LEXAPRO) 10 MG tablet Take 10 mg by mouth.      [DISCONTINUED] HYDROcodone-acetaminophen (NORCO) 5-325 mg per tablet Take 1 tablet by mouth every 6 (six) hours as needed.      [DISCONTINUED] meclizine (ANTIVERT) 12.5 mg tablet Take 12.5-25 mg by mouth.       "[DISCONTINUED] methocarbamoL (ROBAXIN) 750 MG Tab Take 750 mg by mouth.      [DISCONTINUED] ondansetron (ZOFRAN) 8 MG tablet Take 8 mg by mouth.      [DISCONTINUED] paroxetine (PAXIL) 10 MG tablet Take 10 mg by mouth every evening.      [DISCONTINUED] prochlorperazine (COMPAZINE) 10 MG tablet Take 10 mg by mouth 4 (four) times daily as needed.      [DISCONTINUED] tirzepatide (MOUNJARO) 5 mg/0.5 mL PnIj Mounjaro 5 mg/0.5 mL subcutaneous pen injector   INJECT 5MG UNDER THE SKIN ONCE WEEKLY      [DISCONTINUED] venlafaxine (EFFEXOR-XR) 37.5 MG 24 hr capsule Take by mouth.       No current facility-administered medications on file prior to visit.      Review of patient's allergies indicates:   Allergen Reactions    Penicillins Hives, Other (See Comments), Rash, Shortness Of Breath and Swelling        Past Medical History:   Diagnosis Date    Cancer     Hypertension       Past Surgical History:   Procedure Laterality Date    HYSTERECTOMY      KNEE SURGERY Right     LYMPHADENECTOMY Left      Family History   Problem Relation Age of Onset    Depression Father     Hypertension Father      Social History     Socioeconomic History    Marital status: Single    Number of children: 3   Tobacco Use    Smoking status: Every Day     Types: Vaping with nicotine    Smokeless tobacco: Never   Substance and Sexual Activity    Alcohol use: Not Currently    Drug use: Never    Sexual activity: Yes        No results found for: "WBC", "HGB", "HCT", "MCV", "PLT"     CMP  No results found for: "NA", "K", "CL", "CO2", "GLU", "BUN", "CREATININE", "CALCIUM", "PROT", "ALBUMIN", "BILITOT", "ALKPHOS", "AST", "ALT", "ANIONGAP", "ESTGFRAFRICA", "EGFRNONAA"  No results found for: "LABA1C", "HGBA1C"      OBJECTIVE  /79 (BP Location: Right arm, Patient Position: Sitting, BP Method: Medium (Automatic))   Pulse 63   Temp 97.8 °F (36.6 °C) (Oral)   Resp 18   Ht 5' 4" (1.626 m)   Wt 117.5 kg (259 lb)   SpO2 100%   BMI 44.46 kg/m²      Physical " Exam  Vitals and nursing note reviewed.   Constitutional:       Appearance: She is obese. She is ill-appearing.   HENT:      Right Ear: Tympanic membrane normal.      Left Ear: Tympanic membrane normal.      Nose: Congestion and rhinorrhea present.      Comments: Tender maxillary sinuses     Mouth/Throat:      Mouth: Mucous membranes are moist.   Cardiovascular:      Rate and Rhythm: Normal rate and regular rhythm.      Pulses: Normal pulses.      Heart sounds: Normal heart sounds. No murmur heard.  Abdominal:      General: Abdomen is flat.   Musculoskeletal:         General: Normal range of motion.      Cervical back: Normal range of motion and neck supple.   Lymphadenopathy:      Cervical: No cervical adenopathy.   Skin:     General: Skin is warm.      Capillary Refill: Capillary refill takes less than 2 seconds.   Neurological:      Mental Status: She is alert. Mental status is at baseline.   Psychiatric:         Mood and Affect: Mood normal.            ASSESSMENT & PLAN  1. Cough, unspecified type    2. Acute upper respiratory infection, unspecified    3. Hyperlipidemia, unspecified hyperlipidemia type         Problem List Items Addressed This Visit    None  Visit Diagnoses       Cough, unspecified type    -  Primary    Relevant Orders    POCT Influenza A/B (Completed)    Acute upper respiratory infection, unspecified        Relevant Medications    cefTRIAXone injection 1 g (Completed)    dexAMETHasone injection 4 mg (Completed)    azithromycin (Z-ELLIOT) 250 MG tablet    promethazine-dextromethorphan (PROMETHAZINE-DM) 6.25-15 mg/5 mL Syrp    Hyperlipidemia, unspecified hyperlipidemia type        Relevant Orders    Lipid Panel            Follow up in about 1 week (around 12/15/2023), or if symptoms worsen or fail to improve.

## 2023-12-08 NOTE — LETTER
December 8, 2023      [address] [address]  Phone: [phone]  Fax: [phone]       Patient: Kathleen Gann   YOB: 1985  Date of Visit: 12/08/2023    To Whom It May Concern:    IZZY Gann  was at [facility] on 12/08/2023. The patient may return to work/school on 12/9/2023 without restrictions. If you have any questions or concerns, or if I can be of further assistance, please do not hesitate to contact me.    Sincerely,    Kathleen James NP

## 2024-03-07 DIAGNOSIS — I10 PRIMARY HYPERTENSION: ICD-10-CM

## 2024-03-07 RX ORDER — ATENOLOL 25 MG/1
25 TABLET ORAL DAILY
Qty: 30 TABLET | Refills: 0 | Status: SHIPPED | OUTPATIENT
Start: 2024-03-07 | End: 2024-03-12 | Stop reason: SDUPTHER

## 2024-03-12 ENCOUNTER — OFFICE VISIT (OUTPATIENT)
Dept: FAMILY MEDICINE | Facility: CLINIC | Age: 39
End: 2024-03-12
Payer: COMMERCIAL

## 2024-03-12 VITALS
OXYGEN SATURATION: 96 % | HEIGHT: 64 IN | DIASTOLIC BLOOD PRESSURE: 78 MMHG | HEART RATE: 70 BPM | WEIGHT: 261 LBS | SYSTOLIC BLOOD PRESSURE: 113 MMHG | RESPIRATION RATE: 20 BRPM | BODY MASS INDEX: 44.56 KG/M2 | TEMPERATURE: 98 F

## 2024-03-12 DIAGNOSIS — I10 PRIMARY HYPERTENSION: Primary | ICD-10-CM

## 2024-03-12 DIAGNOSIS — R53.83 FATIGUE, UNSPECIFIED TYPE: ICD-10-CM

## 2024-03-12 PROBLEM — J06.9 ACUTE UPPER RESPIRATORY INFECTION, UNSPECIFIED: Status: RESOLVED | Noted: 2023-12-08 | Resolved: 2024-03-12

## 2024-03-12 LAB
BASOPHILS # BLD AUTO: 0.08 K/UL (ref 0–0.2)
BASOPHILS NFR BLD AUTO: 1 % (ref 0–1)
DIFFERENTIAL METHOD BLD: ABNORMAL
EOSINOPHIL # BLD AUTO: 0.09 K/UL (ref 0–0.5)
EOSINOPHIL NFR BLD AUTO: 1.2 % (ref 1–4)
ERYTHROCYTE [DISTWIDTH] IN BLOOD BY AUTOMATED COUNT: 13 % (ref 11.5–14.5)
HCT VFR BLD AUTO: 41.9 % (ref 38–47)
HGB BLD-MCNC: 13.9 G/DL (ref 12–16)
IMM GRANULOCYTES # BLD AUTO: 0.02 K/UL (ref 0–0.04)
IMM GRANULOCYTES NFR BLD: 0.3 % (ref 0–0.4)
LYMPHOCYTES # BLD AUTO: 2.04 K/UL (ref 1–4.8)
LYMPHOCYTES NFR BLD AUTO: 26.5 % (ref 27–41)
MCH RBC QN AUTO: 28.7 PG (ref 27–31)
MCHC RBC AUTO-ENTMCNC: 33.2 G/DL (ref 32–36)
MCV RBC AUTO: 86.4 FL (ref 80–96)
MONOCYTES # BLD AUTO: 0.41 K/UL (ref 0–0.8)
MONOCYTES NFR BLD AUTO: 5.3 % (ref 2–6)
MPC BLD CALC-MCNC: 10.7 FL (ref 9.4–12.4)
NEUTROPHILS # BLD AUTO: 5.05 K/UL (ref 1.8–7.7)
NEUTROPHILS NFR BLD AUTO: 65.7 % (ref 53–65)
NRBC # BLD AUTO: 0 X10E3/UL
NRBC, AUTO (.00): 0 %
PLATELET # BLD AUTO: 273 K/UL (ref 150–400)
RBC # BLD AUTO: 4.85 M/UL (ref 4.2–5.4)
WBC # BLD AUTO: 7.69 K/UL (ref 4.5–11)

## 2024-03-12 PROCEDURE — 3008F BODY MASS INDEX DOCD: CPT | Mod: CPTII,,, | Performed by: STUDENT IN AN ORGANIZED HEALTH CARE EDUCATION/TRAINING PROGRAM

## 2024-03-12 PROCEDURE — 1159F MED LIST DOCD IN RCRD: CPT | Mod: CPTII,,, | Performed by: STUDENT IN AN ORGANIZED HEALTH CARE EDUCATION/TRAINING PROGRAM

## 2024-03-12 PROCEDURE — 82607 VITAMIN B-12: CPT | Mod: ,,, | Performed by: CLINICAL MEDICAL LABORATORY

## 2024-03-12 PROCEDURE — 3078F DIAST BP <80 MM HG: CPT | Mod: CPTII,,, | Performed by: STUDENT IN AN ORGANIZED HEALTH CARE EDUCATION/TRAINING PROGRAM

## 2024-03-12 PROCEDURE — 1160F RVW MEDS BY RX/DR IN RCRD: CPT | Mod: CPTII,,, | Performed by: STUDENT IN AN ORGANIZED HEALTH CARE EDUCATION/TRAINING PROGRAM

## 2024-03-12 PROCEDURE — 80050 GENERAL HEALTH PANEL: CPT | Mod: ,,, | Performed by: CLINICAL MEDICAL LABORATORY

## 2024-03-12 PROCEDURE — 82306 VITAMIN D 25 HYDROXY: CPT | Mod: ,,, | Performed by: CLINICAL MEDICAL LABORATORY

## 2024-03-12 PROCEDURE — 99213 OFFICE O/P EST LOW 20 MIN: CPT | Mod: ,,, | Performed by: STUDENT IN AN ORGANIZED HEALTH CARE EDUCATION/TRAINING PROGRAM

## 2024-03-12 PROCEDURE — 82746 ASSAY OF FOLIC ACID SERUM: CPT | Mod: ,,, | Performed by: CLINICAL MEDICAL LABORATORY

## 2024-03-12 PROCEDURE — 3074F SYST BP LT 130 MM HG: CPT | Mod: CPTII,,, | Performed by: STUDENT IN AN ORGANIZED HEALTH CARE EDUCATION/TRAINING PROGRAM

## 2024-03-12 RX ORDER — ERGOCALCIFEROL 1.25 1/1
50000 CAPSULE ORAL
COMMUNITY
Start: 2024-02-21

## 2024-03-12 RX ORDER — TRIAMTERENE AND HYDROCHLOROTHIAZIDE 37.5; 25 MG/1; MG/1
1 CAPSULE ORAL EVERY MORNING
Qty: 90 CAPSULE | Refills: 1 | Status: SHIPPED | OUTPATIENT
Start: 2024-03-12 | End: 2024-04-16

## 2024-03-12 RX ORDER — ATENOLOL 25 MG/1
25 TABLET ORAL DAILY
Qty: 90 TABLET | Refills: 1 | Status: SHIPPED | OUTPATIENT
Start: 2024-03-12 | End: 2025-03-12

## 2024-03-12 NOTE — PROGRESS NOTES
"Subjective:      Kathleen Gann is a 39 y.o.female who presents to clinic for Hypertension and Medication Refill    Hypertension:    BP Readings from Last 3 Encounters:   03/12/24 113/78   12/08/23 119/79   11/09/23 116/79      Current medications: atenolol 25mg daily, dyazide 37.5-25mg daily      No results found for: "NA", "K", "CREATININE", "QGFR", "LABMICR"    Patient would also like to get her vitamin levels checked. She is currently on chemotherapy for breast cancer and was told that her vitamin levels could be affected as a results. Noticed an increase in fatigue, but could be due to medications.     Review of Systems   Eyes:  Negative for blurred vision and double vision.   Respiratory:  Negative for shortness of breath.    Cardiovascular:  Negative for chest pain, palpitations and leg swelling.   Neurological:  Negative for dizziness and headaches.        Past Medical History:  has a past medical history of Cancer and Hypertension.   Past Surgical History:  has a past surgical history that includes Hysterectomy; Knee surgery (Right); and Lymphadenectomy (Left).  Family History: family history includes Depression in her father; Hypertension in her father.  Social History:  reports that she has been smoking vaping with nicotine. She has never used smokeless tobacco. She reports that she does not currently use alcohol. She reports that she does not use drugs.  Allergies:   Review of patient's allergies indicates:   Allergen Reactions    Penicillins Hives, Other (See Comments), Rash, Shortness Of Breath and Swelling       Objective:     Vitals:    03/12/24 1306   BP: 113/78   Pulse: 70   Resp: 20   Temp: 98.1 °F (36.7 °C)     Physical Exam  Vitals reviewed.   Constitutional:       General: She is not in acute distress.     Appearance: Normal appearance. She is not ill-appearing.   HENT:      Head: Normocephalic and atraumatic.      Right Ear: External ear normal.      Left Ear: External ear normal.   Eyes:      " General: No scleral icterus.        Right eye: No discharge.         Left eye: No discharge.      Conjunctiva/sclera: Conjunctivae normal.   Cardiovascular:      Rate and Rhythm: Normal rate and regular rhythm.      Pulses: Normal pulses.   Pulmonary:      Effort: Pulmonary effort is normal. No respiratory distress.      Breath sounds: Normal breath sounds. No wheezing.   Neurological:      General: No focal deficit present.      Mental Status: She is alert.   Psychiatric:         Behavior: Behavior normal.          The 10-year ASCVD risk score (Bhanu DK, et al., 2019) is: 5.8%    Values used to calculate the score:      Age: 39 years      Sex: Female      Is Non- : No      Diabetic: No      Tobacco smoker: Yes      Systolic Blood Pressure: 113 mmHg      Is BP treated: Yes      HDL Cholesterol: 34 mg/dL      Total Cholesterol: 192 mg/dL    Assessment/Plan:     1. Primary hypertension  - well controlled; update labs with below and cont current therapy  - atenoloL (TENORMIN) 25 MG tablet; Take 1 tablet (25 mg total) by mouth once daily.  Dispense: 90 tablet; Refill: 1  - triamterene-hydrochlorothiazide 37.5-25 mg (DYAZIDE) 37.5-25 mg per capsule; Take 1 capsule by mouth every morning.  Dispense: 90 capsule; Refill: 1  - CBC Auto Differential; Future  - Comprehensive Metabolic Panel; Future  - CBC Auto Differential  - Comprehensive Metabolic Panel    2. Fatigue, unspecified type  - further evaluate with below  - Vitamin B12 & Folate; Future  - Vitamin D; Future  - TSH; Future  - Vitamin B12 & Folate  - Vitamin D  - TSH        Return to clinic in 6 months for f/u chronic conditions or sooner if needed.     Makenzie Junior MD  Family Medicine  03/12/2024

## 2024-03-12 NOTE — Clinical Note
Currently being treated for breast cancer. Gets her surveillance mammos with Dr. Modi (radiation oncologist)

## 2024-03-13 LAB
25(OH)D3 SERPL-MCNC: 26.9 NG/ML
ALBUMIN SERPL BCP-MCNC: 3.2 G/DL (ref 3.5–5)
ALBUMIN/GLOB SERPL: 0.9 {RATIO}
ALP SERPL-CCNC: 54 U/L (ref 37–98)
ALT SERPL W P-5'-P-CCNC: 18 U/L (ref 13–56)
ANION GAP SERPL CALCULATED.3IONS-SCNC: 12 MMOL/L (ref 7–16)
AST SERPL W P-5'-P-CCNC: 10 U/L (ref 15–37)
BILIRUB SERPL-MCNC: 0.2 MG/DL (ref ?–1.2)
BUN SERPL-MCNC: 12 MG/DL (ref 7–18)
BUN/CREAT SERPL: 19 (ref 6–20)
CALCIUM SERPL-MCNC: 8 MG/DL (ref 8.5–10.1)
CHLORIDE SERPL-SCNC: 108 MMOL/L (ref 98–107)
CO2 SERPL-SCNC: 25 MMOL/L (ref 21–32)
CREAT SERPL-MCNC: 0.62 MG/DL (ref 0.55–1.02)
EGFR (NO RACE VARIABLE) (RUSH/TITUS): 115 ML/MIN/1.73M2
GLOBULIN SER-MCNC: 3.7 G/DL (ref 2–4)
GLUCOSE SERPL-MCNC: 66 MG/DL (ref 74–106)
POTASSIUM SERPL-SCNC: 4.6 MMOL/L (ref 3.5–5.1)
PROT SERPL-MCNC: 6.9 G/DL (ref 6.4–8.2)
SODIUM SERPL-SCNC: 140 MMOL/L (ref 136–145)
TSH SERPL DL<=0.005 MIU/L-ACNC: 1.64 UIU/ML (ref 0.36–3.74)

## 2024-03-19 LAB
FOLATE SERPL-MCNC: 4.9 NG/ML (ref 3.1–17.5)
VIT B12 SERPL-MCNC: 512 PG/ML (ref 193–986)

## 2024-03-25 ENCOUNTER — PATIENT OUTREACH (OUTPATIENT)
Dept: ADMINISTRATIVE | Facility: HOSPITAL | Age: 39
End: 2024-03-25

## 2024-03-25 NOTE — PROGRESS NOTES
03/25/2024   --Chart accessed for: Care Gaps  --Care Gaps addressed: mammogram  Immunization Database (Immprint/MIXX) reviewed. Vaccinations uploaded:n/a  [facility]  Health Maintenance Due   Topic Date Due    Hepatitis C Screening  Never done    COVID-19 Vaccine (1) Never done    Pneumococcal Vaccines (Age 0-64) (1 of 2 - PCV) Never done    TETANUS VACCINE  Never done    Hemoglobin A1c (Diabetic Prevention Screening)  Never done    Mammogram  09/22/2023

## 2024-04-05 ENCOUNTER — PATIENT OUTREACH (OUTPATIENT)
Dept: ADMINISTRATIVE | Facility: HOSPITAL | Age: 39
End: 2024-04-05

## 2024-04-05 NOTE — LETTER
AUTHORIZATION FOR RELEASE OF   CONFIDENTIAL INFORMATION    Dear Elin,    We are seeing Kathleen Gann, date of birth 1985, in the clinic at [facility]. Makenzie Junior MD is the patient's PCP. Kathleen Gann has an outstanding lab/procedure at the time we reviewed her chart. In order to help keep her health information updated, she has authorized us to request the following medical record(s):        (X)  MAMMOGRAM                                      (  )  COLONOSCOPY      (  )  PAP SMEAR                                          (  )  OUTSIDE LAB RESULTS     (  )  DEXA SCAN                                          (  )  EYE EXAM            (  )  FOOT EXAM                                          (  )  ENTIRE RECORD     (  )  OUTSIDE IMMUNIZATIONS                 (  )  _______________         Please fax records to [facility], Anne Garcia, [phone].     If you have any questions, please contact [phone].          Patient Name: Kathleen Gann  : 1985  Patient Phone #: [phone]

## 2024-04-05 NOTE — PROGRESS NOTES
Requested mammogram from [facility], but no mammogram on file. Stated pt had image done at [facility], will request

## 2024-04-09 ENCOUNTER — PATIENT OUTREACH (OUTPATIENT)
Dept: ADMINISTRATIVE | Facility: HOSPITAL | Age: 39
End: 2024-04-09

## 2024-04-09 NOTE — PROGRESS NOTES
Requested mammogram from [facility] [place] and [facility], but no report was found.

## 2024-04-16 ENCOUNTER — OFFICE VISIT (OUTPATIENT)
Dept: FAMILY MEDICINE | Facility: CLINIC | Age: 39
End: 2024-04-16
Payer: COMMERCIAL

## 2024-04-16 VITALS
OXYGEN SATURATION: 95 % | DIASTOLIC BLOOD PRESSURE: 79 MMHG | TEMPERATURE: 98 F | SYSTOLIC BLOOD PRESSURE: 112 MMHG | HEART RATE: 92 BPM | HEIGHT: 64 IN | WEIGHT: 256.19 LBS | BODY MASS INDEX: 43.74 KG/M2

## 2024-04-16 DIAGNOSIS — R05.1 ACUTE COUGH: Primary | ICD-10-CM

## 2024-04-16 DIAGNOSIS — A08.4 VIRAL GASTROENTERITIS: ICD-10-CM

## 2024-04-16 LAB
CTP QC/QA: YES
MOLECULAR STREP A: NEGATIVE
POC MOLECULAR INFLUENZA A AGN: NEGATIVE
POC MOLECULAR INFLUENZA B AGN: NEGATIVE
SARS-COV-2 RDRP RESP QL NAA+PROBE: NEGATIVE

## 2024-04-16 PROCEDURE — 3078F DIAST BP <80 MM HG: CPT | Mod: CPTII,,, | Performed by: STUDENT IN AN ORGANIZED HEALTH CARE EDUCATION/TRAINING PROGRAM

## 2024-04-16 PROCEDURE — 87651 STREP A DNA AMP PROBE: CPT | Mod: QW,,, | Performed by: STUDENT IN AN ORGANIZED HEALTH CARE EDUCATION/TRAINING PROGRAM

## 2024-04-16 PROCEDURE — 3008F BODY MASS INDEX DOCD: CPT | Mod: CPTII,,, | Performed by: STUDENT IN AN ORGANIZED HEALTH CARE EDUCATION/TRAINING PROGRAM

## 2024-04-16 PROCEDURE — 99213 OFFICE O/P EST LOW 20 MIN: CPT | Mod: ,,, | Performed by: STUDENT IN AN ORGANIZED HEALTH CARE EDUCATION/TRAINING PROGRAM

## 2024-04-16 PROCEDURE — 3074F SYST BP LT 130 MM HG: CPT | Mod: CPTII,,, | Performed by: STUDENT IN AN ORGANIZED HEALTH CARE EDUCATION/TRAINING PROGRAM

## 2024-04-16 PROCEDURE — 87635 SARS-COV-2 COVID-19 AMP PRB: CPT | Mod: QW,,, | Performed by: STUDENT IN AN ORGANIZED HEALTH CARE EDUCATION/TRAINING PROGRAM

## 2024-04-16 PROCEDURE — 87502 INFLUENZA DNA AMP PROBE: CPT | Mod: QW,,, | Performed by: STUDENT IN AN ORGANIZED HEALTH CARE EDUCATION/TRAINING PROGRAM

## 2024-04-16 RX ORDER — BENZONATATE 100 MG/1
100 CAPSULE ORAL 3 TIMES DAILY PRN
Qty: 30 CAPSULE | Refills: 0 | Status: SHIPPED | OUTPATIENT
Start: 2024-04-16 | End: 2024-04-26

## 2024-04-16 RX ORDER — LOPERAMIDE HYDROCHLORIDE 2 MG/1
2 CAPSULE ORAL 4 TIMES DAILY PRN
Qty: 30 CAPSULE | Refills: 0 | Status: SHIPPED | OUTPATIENT
Start: 2024-04-16 | End: 2024-04-26

## 2024-04-16 RX ORDER — ONDANSETRON 4 MG/1
4 TABLET, ORALLY DISINTEGRATING ORAL EVERY 6 HOURS PRN
Qty: 30 TABLET | Refills: 1 | Status: SHIPPED | OUTPATIENT
Start: 2024-04-16 | End: 2024-05-16

## 2024-04-16 NOTE — LETTER
April 16, 2024      [facility] [address]  [place] [address]  Phone: [phone]  Fax: [phone]       Patient: Kathleen Gann   YOB: 1985  Date of Visit: 04/16/2024    To Whom It May Concern:    IZZY Gann  was at [facility] on 04/16/2024. Please excuse her from 4/15-4/16/24. The patient may return to work/school on 4/17/24 with no restrictions. If you have any questions or concerns, or if I can be of further assistance, please do not hesitate to contact me.    Sincerely,    Makenzie Junior MD

## 2024-04-16 NOTE — PROGRESS NOTES
Subjective:      Kathleen Gann is a 39 y.o.female who presents to clinic for Emesis, Diarrhea (Started yesterday ), and Cough (Started two weeks )      Symptoms began 2 weeks ago and include: cough. She has taken multiple OTC meds with minimal relief of her symptoms. Denies fevers, chills, or shortness of breath. She also started having multiple episodes of emesis and diarrhea yesterday. She was at a family birthday party over the weekend and multiple individuals have similar symptoms. No episodes of emesis today, but 2-3 episodes of diarrhea. Denies blood in stool and emesis. She hasn't been staying hydrated.       Past Medical History:  has a past medical history of Cancer and Hypertension.   Past Surgical History:  has a past surgical history that includes Hysterectomy; Knee surgery (Right); and Lymphadenectomy (Left).  Family History: family history includes Depression in her father; Hypertension in her father.  Social History:  reports that she has been smoking vaping with nicotine. She has never used smokeless tobacco. She reports that she does not currently use alcohol. She reports that she does not use drugs.  Allergies:   Review of patient's allergies indicates:   Allergen Reactions    Penicillins Hives, Other (See Comments), Rash, Shortness Of Breath and Swelling       Objective:     Vitals:    04/16/24 0857   BP: 112/79   Pulse: 92   Temp: 98.1 °F (36.7 °C)     Physical Exam  Vitals reviewed.   Constitutional:       General: She is not in acute distress.     Appearance: Normal appearance. She is not ill-appearing, toxic-appearing or diaphoretic.   HENT:      Head: Normocephalic and atraumatic.      Right Ear: Tympanic membrane, ear canal and external ear normal.      Left Ear: Tympanic membrane, ear canal and external ear normal.      Nose: Congestion present.      Mouth/Throat:      Mouth: Mucous membranes are moist.      Pharynx: No oropharyngeal exudate or posterior oropharyngeal erythema.   Eyes:       General: No scleral icterus.        Right eye: No discharge.         Left eye: No discharge.   Cardiovascular:      Rate and Rhythm: Normal rate and regular rhythm.      Pulses: Normal pulses.      Heart sounds: Normal heart sounds. No murmur heard.  Pulmonary:      Effort: Pulmonary effort is normal. No tachypnea or respiratory distress.      Breath sounds: Normal breath sounds. No wheezing, rhonchi or rales.   Abdominal:      General: Bowel sounds are increased. There is no distension.      Palpations: Abdomen is soft.      Tenderness: There is no abdominal tenderness. There is no guarding or rebound.   Skin:     General: Skin is warm.   Neurological:      General: No focal deficit present.      Mental Status: She is alert.   Psychiatric:         Behavior: Behavior normal.              Assessment/Plan:     1. Acute cough  - flu, strep, covid negative. CXR for further evaluation due to length of symptoms -> negative  - POCT Influenza A/B Molecular  - POCT Strep A, Molecular  - POCT COVID-19 Rapid Screening  - X-Ray Chest PA And Lateral; Future  - benzonatate (TESSALON) 100 MG capsule; Take 1 capsule (100 mg total) by mouth 3 (three) times daily as needed for Cough.  Dispense: 30 capsule; Refill: 0  - chlorpheniramine-dextromethorp 1-7.5 mg/5 mL Liqd; Take 10 mLs by mouth every 6 (six) hours as needed (cough and congestion).  Dispense: 118 mL; Refill: 0    2. Viral gastroenteritis  - encouraged aggressive oral rehydration to prevent dehydration  - return precautions discussed  - ondansetron (ZOFRAN-ODT) 4 MG TbDL; Take 1 tablet (4 mg total) by mouth every 6 (six) hours as needed (nausea).  Dispense: 30 tablet; Refill: 1  - loperamide (IMODIUM) 2 mg capsule; Take 1 capsule (2 mg total) by mouth 4 (four) times daily as needed for Diarrhea.  Dispense: 30 capsule; Refill: 0        Return to clinic if symptoms persist/worsen and as needed.     Makenzie Junior MD  Family Medicine  04/16/2024

## 2025-07-30 ENCOUNTER — OFFICE VISIT (OUTPATIENT)
Dept: FAMILY MEDICINE | Facility: CLINIC | Age: 40
End: 2025-07-30
Payer: COMMERCIAL

## 2025-07-30 VITALS
OXYGEN SATURATION: 94 % | HEIGHT: 64 IN | TEMPERATURE: 100 F | WEIGHT: 245 LBS | BODY MASS INDEX: 41.83 KG/M2 | DIASTOLIC BLOOD PRESSURE: 91 MMHG | SYSTOLIC BLOOD PRESSURE: 138 MMHG | HEART RATE: 103 BPM

## 2025-07-30 DIAGNOSIS — E78.1 HYPERTRIGLYCERIDEMIA: ICD-10-CM

## 2025-07-30 DIAGNOSIS — R52 BODY ACHES: ICD-10-CM

## 2025-07-30 DIAGNOSIS — R51.9 ACUTE INTRACTABLE HEADACHE, UNSPECIFIED HEADACHE TYPE: ICD-10-CM

## 2025-07-30 DIAGNOSIS — R68.89 FLU-LIKE SYMPTOMS: ICD-10-CM

## 2025-07-30 DIAGNOSIS — Z17.0 MALIGNANT NEOPLASM OF OVERLAPPING SITES OF LEFT BREAST IN FEMALE, ESTROGEN RECEPTOR POSITIVE: ICD-10-CM

## 2025-07-30 DIAGNOSIS — J02.9 SORE THROAT: Primary | ICD-10-CM

## 2025-07-30 DIAGNOSIS — I10 PRIMARY HYPERTENSION: ICD-10-CM

## 2025-07-30 DIAGNOSIS — R35.0 URINARY FREQUENCY: ICD-10-CM

## 2025-07-30 DIAGNOSIS — C50.812 MALIGNANT NEOPLASM OF OVERLAPPING SITES OF LEFT BREAST IN FEMALE, ESTROGEN RECEPTOR POSITIVE: ICD-10-CM

## 2025-07-30 DIAGNOSIS — R30.0 DYSURIA: ICD-10-CM

## 2025-07-30 LAB
BILIRUB SERPL-MCNC: ABNORMAL MG/DL
BLOOD URINE, POC: ABNORMAL
CLARITY, UA: CLEAR
COLOR, UA: YELLOW
CTP QC/QA: YES
GLUCOSE UR QL STRIP: ABNORMAL
KETONES UR QL STRIP: ABNORMAL
LEUKOCYTE ESTERASE URINE, POC: ABNORMAL
MOLECULAR STREP A: NEGATIVE
NITRITE, POC UA: ABNORMAL
PH, POC UA: 6
POC MOLECULAR INFLUENZA A AGN: NEGATIVE
POC MOLECULAR INFLUENZA B AGN: NEGATIVE
PROTEIN, POC: ABNORMAL
SARS-COV-2 RDRP RESP QL NAA+PROBE: NEGATIVE
SPECIFIC GRAVITY, POC UA: 1.02
UROBILINOGEN, POC UA: 0.2

## 2025-07-30 RX ORDER — KETOROLAC TROMETHAMINE 30 MG/ML
60 INJECTION, SOLUTION INTRAMUSCULAR; INTRAVENOUS
Status: COMPLETED | OUTPATIENT
Start: 2025-07-30 | End: 2025-07-30

## 2025-07-30 RX ORDER — CEPHALEXIN 500 MG/1
500 CAPSULE ORAL EVERY 6 HOURS
Qty: 28 CAPSULE | Refills: 0 | Status: SHIPPED | OUTPATIENT
Start: 2025-07-30 | End: 2025-08-06

## 2025-07-30 RX ORDER — TRIAMTERENE AND HYDROCHLOROTHIAZIDE 37.5; 25 MG/1; MG/1
1 TABLET ORAL EVERY MORNING
COMMUNITY
Start: 2025-03-05

## 2025-07-30 RX ORDER — ATORVASTATIN CALCIUM 10 MG/1
10 TABLET, FILM COATED ORAL DAILY
COMMUNITY
Start: 2025-07-02

## 2025-07-30 RX ORDER — DEXAMETHASONE SODIUM PHOSPHATE 4 MG/ML
4 INJECTION, SOLUTION INTRA-ARTICULAR; INTRALESIONAL; INTRAMUSCULAR; INTRAVENOUS; SOFT TISSUE
Status: COMPLETED | OUTPATIENT
Start: 2025-07-30 | End: 2025-07-30

## 2025-07-30 RX ORDER — LETROZOLE 2.5 MG/1
2.5 TABLET, FILM COATED ORAL DAILY
COMMUNITY
Start: 2025-06-24

## 2025-07-30 RX ADMIN — DEXAMETHASONE SODIUM PHOSPHATE 4 MG: 4 INJECTION, SOLUTION INTRA-ARTICULAR; INTRALESIONAL; INTRAMUSCULAR; INTRAVENOUS; SOFT TISSUE at 12:07

## 2025-07-30 RX ADMIN — KETOROLAC TROMETHAMINE 60 MG: 30 INJECTION, SOLUTION INTRAMUSCULAR; INTRAVENOUS at 12:07

## 2025-07-30 NOTE — PROGRESS NOTES
[facility]   [address] [place] [address]     PATIENT NAME: Kathleen Gann  : 1985  DATE: 25  MRN: [id number]      Billing Provider: [facility]  Level of Service:   Patient PCP Information       Provider PCP Type    Makenzie Junior MD General            Reason for Visit / Chief Complaint: Sore Throat, Headache, and Generalized Body Aches (Symptoms started yesterday afternoon)       Update PCP  Update Chief Complaint         History of Present Illness / Problem Focused Workflow     Kathleen Gann presents to the clinic with Sore Throat, Headache, and Generalized Body Aches (Symptoms started yesterday afternoon)     Sore Throat   Associated symptoms include headaches.   Headache   Associated symptoms include a sore throat.     Reports onset sore throat, body aches, and throbbing headache since yesterday. Denies hx of headaches. She is also having urinary frequency. No weakness, numbness, or vision changes.     Review of Systems     Review of Systems   HENT:  Positive for sore throat.    Musculoskeletal:  Positive for myalgias.   Neurological:  Positive for headaches.        Medical / Social / Family History     Past Medical History:   Diagnosis Date    Cancer     Hypertension        Past Surgical History:   Procedure Laterality Date    HYSTERECTOMY      KNEE SURGERY Right     LYMPHADENECTOMY Left        Social History  Ms.  reports that she has been smoking vaping with nicotine. She has never used smokeless tobacco. She reports that she does not currently use alcohol. She reports that she does not use drugs.    Family History  Ms.'s family history includes Depression in her father; Hypertension in her father.    Medications and Allergies     Medications  Outpatient Medications Marked as Taking for the 25 encounter (Office Visit) with Elie Sierra FNP-C   Medication Sig Dispense Refill    ANTACID-ANTIGAS 200-200-20 mg/5 mL Susp SMARTSI  "By Mouth Every 2 Hours PRN      atorvastatin (LIPITOR) 10 MG tablet Take 10 mg by mouth once daily.      letrozole (FEMARA) 2.5 mg Tab Take 2.5 mg by mouth once daily.      triamterene-hydrochlorothiazide 37.5-25 mg (MAXZIDE-25) 37.5-25 mg per tablet Take 1 tablet by mouth every morning.      VITAMIN D2 1,250 mcg (50,000 unit) capsule Take 50,000 Units by mouth every 7 days.       Current Facility-Administered Medications for the 7/30/25 encounter (Office Visit) with Elie Sierra FNP-C   Medication Dose Route Frequency Provider Last Rate Last Admin    dexAMETHasone injection 4 mg  4 mg Intramuscular 1 time in Clinic/HOD Elie Sierra FNP-C        ketorolac injection 60 mg  60 mg Intramuscular 1 time in Clinic/HOD Elie Sierra FNP-C           Allergies  Review of patient's allergies indicates:   Allergen Reactions    Penicillins Hives, Other (See Comments), Rash, Shortness Of Breath and Swelling       Physical Examination   BP (!) 138/91   Pulse 103   Temp 99.8 °F (37.7 °C)   Ht 5' 4" (1.626 m)   Wt 111.1 kg (245 lb)   SpO2 (!) 94%   BMI 42.05 kg/m²    Physical Exam  Constitutional:       General: She is not in acute distress.     Appearance: She is not ill-appearing or diaphoretic.   HENT:      Head: Normocephalic.      Nose: Nose normal.      Mouth/Throat:      Mouth: Mucous membranes are moist.   Eyes:      Extraocular Movements: Extraocular movements intact.      Pupils: Pupils are equal, round, and reactive to light.   Cardiovascular:      Rate and Rhythm: Regular rhythm. Tachycardia present.      Pulses: Normal pulses.      Heart sounds: Normal heart sounds.   Pulmonary:      Effort: Pulmonary effort is normal.      Breath sounds: Normal breath sounds.   Musculoskeletal:         General: Normal range of motion.      Cervical back: Normal range of motion.   Skin:     General: Skin is warm and dry.      Capillary Refill: Capillary refill takes less than 2 seconds. "   Neurological:      Mental Status: She is alert and oriented to person, place, and time.      Sensory: No sensory deficit.      Motor: No weakness.      Gait: Gait normal.   Psychiatric:         Mood and Affect: Mood normal.         Behavior: Behavior normal.        Assessment and Plan (including Health Maintenance)      Problem List  Smart Sets  Document Outside HM   :    Plan: Treatment as indicated above        Health Maintenance Due   Topic Date Due    Hepatitis C Screening  Never done    Pneumococcal Vaccines (Age 0-49) (1 of 2 - PCV) Never done    TETANUS VACCINE  05/06/2014    Hemoglobin A1c (Diabetic Prevention Screening)  Never done    Mammogram  09/22/2023    COVID-19 Vaccine (1 - 2024-25 season) Never done       Problem List Items Addressed This Visit    None  Visit Diagnoses         Sore throat    -  Primary    Relevant Orders    POCT Strep A, Molecular (Completed)      Acute intractable headache, unspecified headache type        Relevant Orders    POCT Influenza A/B Molecular (Completed)    POCT COVID-19 Rapid Screening (Completed)      Body aches        Relevant Orders    POCT Influenza A/B Molecular (Completed)    POCT COVID-19 Rapid Screening (Completed)      Urinary frequency        Relevant Orders    POCT URINALYSIS W/O SCOPE      Influenza                Health Maintenance Topics with due status: Not Due       Topic Last Completion Date    Influenza Vaccine 11/09/2023    RSV Vaccine (Age 60+ and Pregnant patients) Not Due       No future appointments.         Signature:  [facility]      [address]             [place] [address]    Date of encounter: 7/30/25

## 2025-07-30 NOTE — PATIENT INSTRUCTIONS
Take a 81 mg EC Baby Aspirin once daily for 1 week.   Your prescriptions have been electronically sent to the pharmacy you listed when you checked in. Drink plenty of fluids. Use Tylenol or Ibuprofen for pain or fever, if not allergic or contraindicated. If your condition persists/worsens or new symptoms appear come back to clinic or go to the emergency room.

## 2025-07-31 ENCOUNTER — PATIENT MESSAGE (OUTPATIENT)
Facility: HOSPITAL | Age: 40
End: 2025-07-31
Payer: COMMERCIAL

## 2025-08-22 ENCOUNTER — PATIENT OUTREACH (OUTPATIENT)
Facility: HOSPITAL | Age: 40
End: 2025-08-22
Payer: COMMERCIAL

## 2025-08-22 VITALS — DIASTOLIC BLOOD PRESSURE: 80 MMHG | SYSTOLIC BLOOD PRESSURE: 138 MMHG
